# Patient Record
Sex: MALE | Race: BLACK OR AFRICAN AMERICAN | URBAN - METROPOLITAN AREA
[De-identification: names, ages, dates, MRNs, and addresses within clinical notes are randomized per-mention and may not be internally consistent; named-entity substitution may affect disease eponyms.]

---

## 2022-01-14 ENCOUNTER — WEB ENCOUNTER (OUTPATIENT)
Dept: URBAN - METROPOLITAN AREA CLINIC 40 | Facility: CLINIC | Age: 62
End: 2022-01-14

## 2022-01-19 ENCOUNTER — OFFICE VISIT (OUTPATIENT)
Dept: URBAN - METROPOLITAN AREA CLINIC 74 | Facility: CLINIC | Age: 62
End: 2022-01-19
Payer: COMMERCIAL

## 2022-01-19 ENCOUNTER — WEB ENCOUNTER (OUTPATIENT)
Dept: URBAN - METROPOLITAN AREA CLINIC 74 | Facility: CLINIC | Age: 62
End: 2022-01-19

## 2022-01-19 DIAGNOSIS — Z12.11 COLON CANCER SCREENING: ICD-10-CM

## 2022-01-19 PROCEDURE — 99202 OFFICE O/P NEW SF 15 MIN: CPT | Performed by: STUDENT IN AN ORGANIZED HEALTH CARE EDUCATION/TRAINING PROGRAM

## 2022-01-19 RX ORDER — LISINOPRIL 10 MG/1
1 TABLET TABLET ORAL ONCE A DAY
Status: ACTIVE | COMMUNITY

## 2022-01-19 RX ORDER — QUETIAPINE 100 MG/1
1 TABLET AT BEDTIME TABLET, FILM COATED ORAL ONCE A DAY
Status: ACTIVE | COMMUNITY

## 2022-01-19 RX ORDER — SODIUM PICOSULFATE, MAGNESIUM OXIDE, AND ANHYDROUS CITRIC ACID 10; 3.5; 12 MG/160ML; G/160ML; G/160ML
160 ML LIQUID ORAL
Qty: 320 MILLILITER | Refills: 0 | OUTPATIENT
Start: 2022-01-19 | End: 2022-01-20

## 2022-01-19 RX ORDER — AMLODIPINE BESYLATE 5 MG/1
1 TABLET TABLET ORAL ONCE A DAY
Status: ACTIVE | COMMUNITY

## 2022-01-19 RX ORDER — LITHIUM CARBONATE 150 MG/1
1 CAPSULE CAPSULE, GELATIN COATED ORAL TWICE A DAY
Status: ACTIVE | COMMUNITY

## 2022-01-19 RX ORDER — PANTOPRAZOLE SODIUM 40 MG/1
1 TABLET TABLET, DELAYED RELEASE ORAL ONCE A DAY
Status: ACTIVE | COMMUNITY

## 2022-01-19 NOTE — HPI-TODAY'S VISIT:
61-year-old male New to me Comes in to set up his colonoscopy No prior colonoscopy ever. Denies any GI specific symptoms.  No abdomen pain, nausea, vomiting, reflux, dysphagia, constipation, diarrhea or blood in stool. No family history of colon cancer. No anticoagulation.

## 2022-03-31 ENCOUNTER — OFFICE VISIT (OUTPATIENT)
Dept: URBAN - METROPOLITAN AREA SURGERY CENTER 30 | Facility: SURGERY CENTER | Age: 62
End: 2022-03-31

## 2022-03-31 ENCOUNTER — TELEPHONE ENCOUNTER (OUTPATIENT)
Dept: URBAN - METROPOLITAN AREA CLINIC 92 | Facility: CLINIC | Age: 62
End: 2022-03-31

## 2022-03-31 ENCOUNTER — OFFICE VISIT (OUTPATIENT)
Dept: URBAN - METROPOLITAN AREA SURGERY CENTER 30 | Facility: SURGERY CENTER | Age: 62
End: 2022-03-31
Payer: COMMERCIAL

## 2022-03-31 ENCOUNTER — CLAIMS CREATED FROM THE CLAIM WINDOW (OUTPATIENT)
Dept: URBAN - METROPOLITAN AREA CLINIC 4 | Facility: CLINIC | Age: 62
End: 2022-03-31
Payer: COMMERCIAL

## 2022-03-31 DIAGNOSIS — Z12.11 COLON CANCER SCREENING: ICD-10-CM

## 2022-03-31 DIAGNOSIS — D12.8 BENIGN NEOPLASM OF RECTUM: ICD-10-CM

## 2022-03-31 DIAGNOSIS — D12.4 BENIGN NEOPLASM OF DESCENDING COLON: ICD-10-CM

## 2022-03-31 DIAGNOSIS — D12.7 ADENOMA DETERMINED BY COLORECTAL BIOPSY: ICD-10-CM

## 2022-03-31 DIAGNOSIS — D12.2 BENIGN NEOPLASM OF ASCENDING COLON: ICD-10-CM

## 2022-03-31 DIAGNOSIS — D12.3 ADENOMA OF TRANSVERSE COLON: ICD-10-CM

## 2022-03-31 DIAGNOSIS — D12.5 BENIGN NEOPLASM OF SIGMOID COLON: ICD-10-CM

## 2022-03-31 DIAGNOSIS — D12.5 ADENOMA OF SIGMOID COLON: ICD-10-CM

## 2022-03-31 DIAGNOSIS — D12.3 BENIGN NEOPLASM OF TRANSVERSE COLON: ICD-10-CM

## 2022-03-31 DIAGNOSIS — D12.2 ADENOMA OF ASCENDING COLON: ICD-10-CM

## 2022-03-31 DIAGNOSIS — D12.4 ADENOMA OF DESCENDING COLON: ICD-10-CM

## 2022-03-31 PROCEDURE — G8907 PT DOC NO EVENTS ON DISCHARG: HCPCS | Performed by: STUDENT IN AN ORGANIZED HEALTH CARE EDUCATION/TRAINING PROGRAM

## 2022-03-31 PROCEDURE — 45385 COLONOSCOPY W/LESION REMOVAL: CPT | Performed by: STUDENT IN AN ORGANIZED HEALTH CARE EDUCATION/TRAINING PROGRAM

## 2022-03-31 PROCEDURE — 88305 TISSUE EXAM BY PATHOLOGIST: CPT | Performed by: PATHOLOGY

## 2022-03-31 RX ORDER — AMLODIPINE BESYLATE 5 MG/1
1 TABLET TABLET ORAL ONCE A DAY
Status: ACTIVE | COMMUNITY

## 2022-03-31 RX ORDER — LITHIUM CARBONATE 150 MG/1
1 CAPSULE CAPSULE, GELATIN COATED ORAL TWICE A DAY
Status: ACTIVE | COMMUNITY

## 2022-03-31 RX ORDER — LISINOPRIL 10 MG/1
1 TABLET TABLET ORAL ONCE A DAY
Status: ACTIVE | COMMUNITY

## 2022-03-31 RX ORDER — QUETIAPINE 100 MG/1
1 TABLET AT BEDTIME TABLET, FILM COATED ORAL ONCE A DAY
Status: ACTIVE | COMMUNITY

## 2022-03-31 RX ORDER — PANTOPRAZOLE SODIUM 40 MG/1
1 TABLET TABLET, DELAYED RELEASE ORAL ONCE A DAY
Status: ACTIVE | COMMUNITY

## 2022-04-01 ENCOUNTER — OUT OF OFFICE VISIT (OUTPATIENT)
Dept: URBAN - METROPOLITAN AREA MEDICAL CENTER 30 | Facility: MEDICAL CENTER | Age: 62
End: 2022-04-01
Payer: COMMERCIAL

## 2022-04-01 DIAGNOSIS — K91.840 BLEEDING AS COMPLICATION OF PANCREATIC-BILIARY SPHINCTEROTOMY: ICD-10-CM

## 2022-04-01 PROCEDURE — 99203 OFFICE O/P NEW LOW 30 MIN: CPT | Performed by: INTERNAL MEDICINE

## 2022-04-04 ENCOUNTER — ERX REFILL RESPONSE (OUTPATIENT)
Dept: URBAN - METROPOLITAN AREA CLINIC 74 | Facility: CLINIC | Age: 62
End: 2022-04-04

## 2022-04-04 RX ORDER — SODIUM PICOSULFATE, MAGNESIUM OXIDE, AND ANHYDROUS CITRIC ACID 10; 3.5; 12 MG/160ML; G/160ML; G/160ML
TAKE 160ML BY MOUTH TWICE DAILY FOR 1 DAY AS DIRECTED LIQUID ORAL
Qty: 320 MILLILITER | Refills: 0 | OUTPATIENT

## 2022-04-04 RX ORDER — SODIUM PICOSULFATE, MAGNESIUM OXIDE, AND ANHYDROUS CITRIC ACID 10; 3.5; 12 MG/160ML; G/160ML; G/160ML
TAKE 160ML BY MOUTH TWICE DAILY FOR 1 DAY AS DIRECTED LIQUID ORAL
Qty: 320 MILLILITER | Refills: 1 | OUTPATIENT

## 2022-04-25 ENCOUNTER — OFFICE VISIT (OUTPATIENT)
Dept: URBAN - METROPOLITAN AREA CLINIC 74 | Facility: CLINIC | Age: 62
End: 2022-04-25

## 2022-04-25 RX ORDER — PANTOPRAZOLE SODIUM 40 MG/1
1 TABLET TABLET, DELAYED RELEASE ORAL ONCE A DAY
Status: ACTIVE | COMMUNITY

## 2022-04-25 RX ORDER — LISINOPRIL 10 MG/1
1 TABLET TABLET ORAL ONCE A DAY
Status: ACTIVE | COMMUNITY

## 2022-04-25 RX ORDER — QUETIAPINE 100 MG/1
1 TABLET AT BEDTIME TABLET, FILM COATED ORAL ONCE A DAY
Status: ACTIVE | COMMUNITY

## 2022-04-25 RX ORDER — AMLODIPINE BESYLATE 5 MG/1
1 TABLET TABLET ORAL ONCE A DAY
Status: ACTIVE | COMMUNITY

## 2022-04-25 RX ORDER — LITHIUM CARBONATE 150 MG/1
1 CAPSULE CAPSULE, GELATIN COATED ORAL TWICE A DAY
Status: ACTIVE | COMMUNITY

## 2022-04-25 RX ORDER — SODIUM PICOSULFATE, MAGNESIUM OXIDE, AND ANHYDROUS CITRIC ACID 10; 3.5; 12 MG/160ML; G/160ML; G/160ML
TAKE 160ML BY MOUTH TWICE DAILY FOR 1 DAY AS DIRECTED LIQUID ORAL
Qty: 320 MILLILITER | Refills: 1 | Status: ACTIVE | COMMUNITY

## 2022-06-07 ENCOUNTER — WEB ENCOUNTER (OUTPATIENT)
Dept: URBAN - METROPOLITAN AREA CLINIC 74 | Facility: CLINIC | Age: 62
End: 2022-06-07

## 2022-06-07 ENCOUNTER — LAB OUTSIDE AN ENCOUNTER (OUTPATIENT)
Dept: URBAN - METROPOLITAN AREA CLINIC 74 | Facility: CLINIC | Age: 62
End: 2022-06-07

## 2022-06-07 ENCOUNTER — CLAIMS CREATED FROM THE CLAIM WINDOW (OUTPATIENT)
Dept: URBAN - METROPOLITAN AREA CLINIC 74 | Facility: CLINIC | Age: 62
End: 2022-06-07
Payer: COMMERCIAL

## 2022-06-07 VITALS
DIASTOLIC BLOOD PRESSURE: 90 MMHG | SYSTOLIC BLOOD PRESSURE: 132 MMHG | HEART RATE: 72 BPM | WEIGHT: 187.6 LBS | BODY MASS INDEX: 29.44 KG/M2 | TEMPERATURE: 96.8 F | OXYGEN SATURATION: 97 % | HEIGHT: 67 IN

## 2022-06-07 DIAGNOSIS — Z98.890 STATUS POST COLONOSCOPY WITH POLYPECTOMY: ICD-10-CM

## 2022-06-07 DIAGNOSIS — D12.6 TUBULAR ADENOMA OF COLON: ICD-10-CM

## 2022-06-07 DIAGNOSIS — R10.13 EPIGASTRIC ABDOMINAL PAIN: ICD-10-CM

## 2022-06-07 DIAGNOSIS — K21.9 GERD WITHOUT ESOPHAGITIS: ICD-10-CM

## 2022-06-07 DIAGNOSIS — Z12.11 COLON CANCER SCREENING: ICD-10-CM

## 2022-06-07 DIAGNOSIS — R14.0 BLOATING: ICD-10-CM

## 2022-06-07 PROCEDURE — 99214 OFFICE O/P EST MOD 30 MIN: CPT | Performed by: INTERNAL MEDICINE

## 2022-06-07 RX ORDER — LISINOPRIL 10 MG/1
1 TABLET TABLET ORAL ONCE A DAY
Status: DISCONTINUED | COMMUNITY

## 2022-06-07 RX ORDER — AMLODIPINE BESYLATE 5 MG/1
1 TABLET TABLET ORAL ONCE A DAY
Status: DISCONTINUED | COMMUNITY

## 2022-06-07 RX ORDER — PANTOPRAZOLE SODIUM 40 MG/1
1 TABLET TABLET, DELAYED RELEASE ORAL ONCE A DAY
Status: ACTIVE | COMMUNITY

## 2022-06-07 RX ORDER — QUETIAPINE 100 MG/1
1 TABLET AT BEDTIME TABLET, FILM COATED ORAL ONCE A DAY
Status: ACTIVE | COMMUNITY

## 2022-06-07 RX ORDER — SODIUM PICOSULFATE, MAGNESIUM OXIDE, AND ANHYDROUS CITRIC ACID 10; 3.5; 12 MG/160ML; G/160ML; G/160ML
TAKE 160ML BY MOUTH TWICE DAILY FOR 1 DAY AS DIRECTED LIQUID ORAL
Qty: 320 MILLILITER | Refills: 1 | Status: ON HOLD | COMMUNITY

## 2022-06-07 RX ORDER — LITHIUM CARBONATE 150 MG/1
1 CAPSULE CAPSULE, GELATIN COATED ORAL TWICE A DAY
Status: ACTIVE | COMMUNITY

## 2022-06-07 NOTE — HPI-TODAY'S VISIT:
61-year-old -American male here for follow-up after recent colonoscopy.  On his colonoscopy, he was noted to have about 18-20 tubular adenomas. He did have post polypectomy bleed that resolved on its own.  This was 2 months ago.   We will refer for genetic counseling, though he denies any family history of colon cancer.  Unfortunately he does not know much of his family history also.  Complains of feeling bloated.  Increased abdominal distention noted per wife.  CT angiogram did not show any significant ascites.  Has longstanding reflux.  No prior upper endoscopy.  Has chronic cough as well.  Former alcoholic. still smokes.

## 2022-06-09 ENCOUNTER — OFFICE VISIT (OUTPATIENT)
Dept: URBAN - METROPOLITAN AREA SURGERY CENTER 30 | Facility: SURGERY CENTER | Age: 62
End: 2022-06-09
Payer: COMMERCIAL

## 2022-06-09 ENCOUNTER — TELEPHONE ENCOUNTER (OUTPATIENT)
Dept: URBAN - METROPOLITAN AREA CLINIC 92 | Facility: CLINIC | Age: 62
End: 2022-06-09

## 2022-06-09 ENCOUNTER — CLAIMS CREATED FROM THE CLAIM WINDOW (OUTPATIENT)
Dept: URBAN - METROPOLITAN AREA CLINIC 4 | Facility: CLINIC | Age: 62
End: 2022-06-09
Payer: COMMERCIAL

## 2022-06-09 DIAGNOSIS — K29.80 ACUTE DUODENITIS: ICD-10-CM

## 2022-06-09 DIAGNOSIS — K31.89 OTHER DISEASES OF STOMACH AND DUODENUM: ICD-10-CM

## 2022-06-09 DIAGNOSIS — K31.89 ACQUIRED DEFORMITY OF DUODENUM: ICD-10-CM

## 2022-06-09 DIAGNOSIS — K25.9 ANTRAL ULCER: ICD-10-CM

## 2022-06-09 PROBLEM — 444898006: Status: ACTIVE | Noted: 2022-06-07

## 2022-06-09 PROBLEM — 116289008: Status: ACTIVE | Noted: 2022-06-07

## 2022-06-09 PROCEDURE — 88305 TISSUE EXAM BY PATHOLOGIST: CPT | Performed by: PATHOLOGY

## 2022-06-09 PROCEDURE — 88312 SPECIAL STAINS GROUP 1: CPT | Performed by: PATHOLOGY

## 2022-06-09 PROCEDURE — 43239 EGD BIOPSY SINGLE/MULTIPLE: CPT | Performed by: INTERNAL MEDICINE

## 2022-06-09 PROCEDURE — G8907 PT DOC NO EVENTS ON DISCHARG: HCPCS | Performed by: INTERNAL MEDICINE

## 2022-06-09 RX ORDER — PANTOPRAZOLE SODIUM 40 MG/1
1 TABLET TABLET, DELAYED RELEASE ORAL ONCE A DAY
Status: ACTIVE | COMMUNITY

## 2022-06-09 RX ORDER — SODIUM PICOSULFATE, MAGNESIUM OXIDE, AND ANHYDROUS CITRIC ACID 10; 3.5; 12 MG/160ML; G/160ML; G/160ML
TAKE 160ML BY MOUTH TWICE DAILY FOR 1 DAY AS DIRECTED LIQUID ORAL
Qty: 320 MILLILITER | Refills: 1 | Status: ON HOLD | COMMUNITY

## 2022-06-09 RX ORDER — LITHIUM CARBONATE 150 MG/1
1 CAPSULE CAPSULE, GELATIN COATED ORAL TWICE A DAY
Status: ACTIVE | COMMUNITY

## 2022-06-09 RX ORDER — QUETIAPINE 100 MG/1
1 TABLET AT BEDTIME TABLET, FILM COATED ORAL ONCE A DAY
Status: ACTIVE | COMMUNITY

## 2022-06-09 RX ORDER — PANTOPRAZOLE SODIUM 40 MG/1
1 TABLET TABLET, DELAYED RELEASE ORAL ONCE A DAY
Qty: 30 TABLET | Refills: 3

## 2022-07-25 ENCOUNTER — OFFICE VISIT (OUTPATIENT)
Dept: URBAN - METROPOLITAN AREA CLINIC 74 | Facility: CLINIC | Age: 62
End: 2022-07-25

## 2022-07-28 ENCOUNTER — OFFICE VISIT (OUTPATIENT)
Dept: URBAN - METROPOLITAN AREA CLINIC 74 | Facility: CLINIC | Age: 62
End: 2022-07-28
Payer: COMMERCIAL

## 2022-07-28 ENCOUNTER — TELEPHONE ENCOUNTER (OUTPATIENT)
Dept: URBAN - METROPOLITAN AREA CLINIC 74 | Facility: CLINIC | Age: 62
End: 2022-07-28

## 2022-07-28 VITALS
BODY MASS INDEX: 29.13 KG/M2 | WEIGHT: 185.6 LBS | HEIGHT: 67 IN | SYSTOLIC BLOOD PRESSURE: 134 MMHG | DIASTOLIC BLOOD PRESSURE: 82 MMHG | TEMPERATURE: 98.1 F | OXYGEN SATURATION: 96 % | HEART RATE: 83 BPM

## 2022-07-28 DIAGNOSIS — Z86.010 PERSONAL HISTORY OF COLONIC POLYPS: ICD-10-CM

## 2022-07-28 DIAGNOSIS — K44.9 HIATAL HERNIA: ICD-10-CM

## 2022-07-28 DIAGNOSIS — K25.7 CHRONIC GASTRIC ULCER WITHOUT HEMORRHAGE AND WITHOUT PERFORATION: ICD-10-CM

## 2022-07-28 DIAGNOSIS — K29.80 DUODENITIS: ICD-10-CM

## 2022-07-28 DIAGNOSIS — K29.40 ATROPHIC GASTRITIS WITHOUT HEMORRHAGE: ICD-10-CM

## 2022-07-28 PROBLEM — 72007001: Status: ACTIVE | Noted: 2022-07-26

## 2022-07-28 PROBLEM — 84089009: Status: ACTIVE | Noted: 2022-07-28

## 2022-07-28 PROBLEM — 84568007: Status: ACTIVE | Noted: 2022-07-26

## 2022-07-28 PROCEDURE — 99213 OFFICE O/P EST LOW 20 MIN: CPT | Performed by: PHYSICIAN ASSISTANT

## 2022-07-28 RX ORDER — LITHIUM CARBONATE 150 MG/1
1 CAPSULE CAPSULE, GELATIN COATED ORAL TWICE A DAY
COMMUNITY

## 2022-07-28 RX ORDER — SODIUM PICOSULFATE, MAGNESIUM OXIDE, AND ANHYDROUS CITRIC ACID 10; 3.5; 12 MG/160ML; G/160ML; G/160ML
TAKE 160ML BY MOUTH TWICE DAILY FOR 1 DAY AS DIRECTED LIQUID ORAL
Qty: 320 MILLILITER | Refills: 1 | Status: DISCONTINUED | COMMUNITY

## 2022-07-28 RX ORDER — PANTOPRAZOLE SODIUM 40 MG/1
1 TABLET TABLET, DELAYED RELEASE ORAL ONCE A DAY
Qty: 30 TABLET | Refills: 3 | COMMUNITY

## 2022-07-28 RX ORDER — QUETIAPINE 100 MG/1
1 TABLET AT BEDTIME TABLET, FILM COATED ORAL ONCE A DAY
COMMUNITY

## 2022-07-28 NOTE — HPI-TODAY'S VISIT:
The patient is 61 year-old male known to Dr. Clay returning to our clinic today to discuss his EGD result. He is currently on Pantoprazole 40 mg once daily with good result. The patient states that he has not heard from anybody in regard to his biopsy since his procedure. He has  his wife on speaker phone to make sure that we also discuss his procedure results with her. Office visit was completed with his wife on speaker phone. They wanted me to get his lab results from his PCP to review the results. Procedures including EGD and Colonoscopy were reviewed in great deatils with both of them. Reviewed labs and imaging from [facility] on 04/02/2022.   --  The patient denies dyspepsia, dysphagia, odynophagia, hemoptysis, hematemesis, nausea, vomiting, regurgitation, melena, constipation, diarrhea, abdominal pain, hematochezia, fever, chills, chest pain, SOB, or any other GI complaints today. --  The patient denies ETOH and Illicit drug use. He smoke Tobacco daily. -- The patient is up to date with Flu, Pneumonia, Shingles, and COVID vaccine 3/3.  Procedures: -- EGD with biopsy on 06/09/2022 by dr. Clay noted 3 cm hiatal hernia, chronic gastritis, non-bleeding gastric ulcer with no stigma of bleeing. Chronic duodenitis. 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 No H. pylori organisms. No intestinal metaplasia.   -- Colonoscopy with polypectomy on 03/31/2022 by Dr. Clay noted multiple (15 polyps measuring 4-12 mm polys in the sigmoid colon, in the descending colon, at the hepatic flexure, and in the ascending colon. Removed with a hot snare. Resected and retrieved. One 15 mm poly at the heaptic fleaxure, removed with a hot snare. Resected and retrieved. One 14 mm polyp at the recto-sigmoid colon, removed with a hot snare. Resected and retrieved. Biopsy with with tubular adenoma colon polyps. Repeat colonoscopy in one year.

## 2023-01-31 ENCOUNTER — TELEPHONE ENCOUNTER (OUTPATIENT)
Dept: URBAN - METROPOLITAN AREA CLINIC 74 | Facility: CLINIC | Age: 63
End: 2023-01-31

## 2023-02-25 ENCOUNTER — WEB ENCOUNTER (OUTPATIENT)
Dept: URBAN - METROPOLITAN AREA CLINIC 74 | Facility: CLINIC | Age: 63
End: 2023-02-25

## 2023-02-28 ENCOUNTER — OFFICE VISIT (OUTPATIENT)
Dept: URBAN - METROPOLITAN AREA CLINIC 74 | Facility: CLINIC | Age: 63
End: 2023-02-28
Payer: COMMERCIAL

## 2023-02-28 VITALS
TEMPERATURE: 97.7 F | BODY MASS INDEX: 30.1 KG/M2 | HEART RATE: 81 BPM | HEIGHT: 67 IN | SYSTOLIC BLOOD PRESSURE: 148 MMHG | DIASTOLIC BLOOD PRESSURE: 100 MMHG | WEIGHT: 191.8 LBS

## 2023-02-28 DIAGNOSIS — Z87.11 HISTORY OF GASTRIC ULCER: ICD-10-CM

## 2023-02-28 DIAGNOSIS — Z86.010 PERSONAL HISTORY OF COLONIC POLYPS: ICD-10-CM

## 2023-02-28 DIAGNOSIS — K29.30 CHRONIC SUPERFICIAL GASTRITIS WITHOUT BLEEDING: ICD-10-CM

## 2023-02-28 DIAGNOSIS — K25.7 CHRONIC GASTRIC ULCER WITHOUT HEMORRHAGE AND WITHOUT PERFORATION: ICD-10-CM

## 2023-02-28 PROBLEM — 196735001: Status: ACTIVE | Noted: 2023-01-31

## 2023-02-28 PROBLEM — 428283002: Status: ACTIVE | Noted: 2022-07-26

## 2023-02-28 PROBLEM — 76796008: Status: ACTIVE | Noted: 2022-07-26

## 2023-02-28 PROCEDURE — 99214 OFFICE O/P EST MOD 30 MIN: CPT | Performed by: PHYSICIAN ASSISTANT

## 2023-02-28 RX ORDER — LITHIUM CARBONATE 300 MG/1
1 CAPSULE CAPSULE, GELATIN COATED ORAL TWICE A DAY
Status: ACTIVE | COMMUNITY

## 2023-02-28 RX ORDER — PANTOPRAZOLE SODIUM 40 MG/1
1 TABLET TABLET, DELAYED RELEASE ORAL ONCE A DAY
Qty: 30 TABLET | Refills: 3 | Status: ACTIVE | COMMUNITY

## 2023-02-28 RX ORDER — QUETIAPINE 300 MG/1
1 TABLET AT BEDTIME TABLET, FILM COATED ORAL ONCE A DAY
Status: ACTIVE | COMMUNITY

## 2023-02-28 RX ORDER — SUCRALFATE 1 G/1
1 TABLET ON AN EMPTY STOMACH TABLET ORAL TWICE A DAY
Qty: 60 TABLET | Refills: 0 | OUTPATIENT
Start: 2023-02-28 | End: 2023-03-30

## 2023-02-28 RX ORDER — LISINOPRIL 20 MG/1
1 TABLET TABLET ORAL ONCE A DAY
Status: ACTIVE | COMMUNITY

## 2023-02-28 RX ORDER — POLYETHYLENE GLYCOL 3350 17 G/17G
AS DIRECTED POWDER, FOR SOLUTION ORAL
Qty: 238 G | Refills: 0 | OUTPATIENT
Start: 2023-02-28 | End: 2023-03-01

## 2023-02-28 RX ORDER — PANTOPRAZOLE SODIUM 40 MG/1
1 TABLET TABLET, DELAYED RELEASE ORAL ONCE A DAY
Qty: 30 TABLET | Refills: 5

## 2023-02-28 NOTE — HPI-TODAY'S VISIT:
The patient is 61 year-old male known to Dr. Clay returning to our clinic today to schedule his colonoscopy. He did have postpolypectomy bleeding after his colonoscopy in 03/2022 and he was admitted to  for 24 hours observation. Last year, Dr. Clay arecommend Colonoscopy in one year and referral to genetic Counsellor for assessment of polyposis syndrome. The patient did not have genetic conunselling. Today, he is complaining of stomach discomfort eventhough he is taking Pantoprazole 40 mg daily. He has pain at epigastric region with burning. No changes in his bowel habits. No overt GI blood loss. No other GI issues. His PCP scheduled him for Liver US. He will bring us a copy of his US result next visit.   --  The patient denies dyspepsia, dysphagia, odynophagia, hemoptysis, hematemesis, nausea, vomiting, regurgitation, melena, constipation, diarrhea, abdominal pain, hematochezia, fever, chills, chest pain, SOB, or any other GI complaints today. --  The patient denies ETOH and Illicit drug use. He smoke Tobacco daily. -- The patient is up to date with Flu, Pneumonia, Shingles, and COVID vaccine 3/3.  Procedures: -- EGD with biopsy on 06/09/2022 by Dr. Clay noted 3 cm hiatal hernia, chronic gastritis, non-bleeding gastric ulcer with no stigma of bleeing. Chronic duodenitis. 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 No H. pylori organisms. No intestinal metaplasia.   -- Colonoscopy with polypectomy on 03/31/2022 by Dr. Clay noted multiple (15 polyps measuring 4-12 mm polys in the sigmoid colon, in the descending colon, at the hepatic flexure, and in the ascending colon. Removed with a hot snare. Resected and retrieved. One 15 mm poly at the heaptic fleaxure, removed with a hot snare. Resected and retrieved. One 14 mm polyp at the recto-sigmoid colon, removed with a hot snare. Resected and retrieved. Biopsy with with tubular adenoma colon polyps. Repeat colonoscopy in one year. Recommend Colonoscopy in one year and referral to genetic Counsellor for assessment of polyposis syndrome.

## 2023-03-28 ENCOUNTER — ERX REFILL RESPONSE (OUTPATIENT)
Dept: URBAN - METROPOLITAN AREA CLINIC 74 | Facility: CLINIC | Age: 63
End: 2023-03-28

## 2023-03-28 RX ORDER — SUCRALFATE 1 G/1
1 TABLET ON AN EMPTY STOMACH TABLET ORAL TWICE A DAY
Qty: 60 TABLET | Refills: 0 | OUTPATIENT

## 2023-03-28 RX ORDER — SUCRALFATE 1 G/1
TAKE 1 TABLET BY MOUTH TWICE DAILY ON AN EMPTY STOMACH TABLET ORAL
Qty: 60 TABLET | Refills: 0 | OUTPATIENT

## 2023-04-13 ENCOUNTER — OFFICE VISIT (OUTPATIENT)
Dept: URBAN - METROPOLITAN AREA SURGERY CENTER 30 | Facility: SURGERY CENTER | Age: 63
End: 2023-04-13

## 2023-05-11 ENCOUNTER — OFFICE VISIT (OUTPATIENT)
Dept: URBAN - METROPOLITAN AREA CLINIC 74 | Facility: CLINIC | Age: 63
End: 2023-05-11

## 2023-05-11 NOTE — HPI-TODAY'S VISIT:
The patient is 61 year-old male known to Dr. Clay returning to our clinic today to discuss his recent EGD and Colonoscopy results.  --  The patient denies dyspepsia, dysphagia, odynophagia, hemoptysis, hematemesis, nausea, vomiting, regurgitation, melena, constipation, diarrhea, abdominal pain, hematochezia, fever, chills, chest pain, SOB, or any other GI complaints today. --  The patient denies ETOH and Illicit drug use. He smoke Tobacco daily. -- The patient is up to date with Flu, Pneumonia, Shingles, and COVID vaccine 3/3.  Procedures: -- EGD with biopsy on 06/09/2022 by Dr. Clay noted 3 cm hiatal hernia, chronic gastritis, non-bleeding gastric ulcer with no stigma of bleeing. Chronic duodenitis. 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 No H. pylori organisms. No intestinal metaplasia.   -- Colonoscopy with polypectomy on 03/31/2022 by Dr. Clay noted multiple (15 polyps measuring 4-12 mm polys in the sigmoid colon, in the descending colon, at the hepatic flexure, and in the ascending colon. Removed with a hot snare. Resected and retrieved. One 15 mm poly at the heaptic fleaxure, removed with a hot snare. Resected and retrieved. One 14 mm polyp at the recto-sigmoid colon, removed with a hot snare. Resected and retrieved. Biopsy with with tubular adenoma colon polyps. Repeat colonoscopy in one year. Recommend Colonoscopy in one year and referral to genetic Counsellor for assessment of polyposis syndrome.

## 2023-05-16 ENCOUNTER — ERX REFILL RESPONSE (OUTPATIENT)
Dept: URBAN - METROPOLITAN AREA CLINIC 74 | Facility: CLINIC | Age: 63
End: 2023-05-16

## 2023-05-16 RX ORDER — SUCRALFATE 1 G/1
TAKE 1 TABLET BY MOUTH TWICE DAILY ON AN EMPTY STOMACH TABLET ORAL
Qty: 60 TABLET | Refills: 0 | OUTPATIENT

## 2023-06-12 ENCOUNTER — ERX REFILL RESPONSE (OUTPATIENT)
Dept: URBAN - METROPOLITAN AREA CLINIC 40 | Facility: CLINIC | Age: 63
End: 2023-06-12

## 2023-06-12 RX ORDER — SUCRALFATE 1 G/1
TAKE 1 TABLET BY MOUTH TWICE DAILY ON AN EMPTY STOMACH TABLET ORAL
Qty: 60 TABLET | Refills: 0 | OUTPATIENT

## 2023-06-13 ENCOUNTER — ERX REFILL RESPONSE (OUTPATIENT)
Dept: URBAN - METROPOLITAN AREA CLINIC 40 | Facility: CLINIC | Age: 63
End: 2023-06-13

## 2023-06-13 RX ORDER — SUCRALFATE 1 G/1
TAKE 1 TABLET BY MOUTH TWICE DAILY ON AN EMPTY STOMACH TABLET ORAL
Qty: 60 TABLET | Refills: 3 | OUTPATIENT

## 2023-06-13 RX ORDER — SUCRALFATE 1 G/1
TAKE 1 TABLET BY MOUTH TWICE DAILY ON AN EMPTY STOMACH TABLET ORAL
Qty: 60 TABLET | Refills: 0 | OUTPATIENT

## 2023-10-15 ENCOUNTER — ERX REFILL RESPONSE (OUTPATIENT)
Dept: URBAN - METROPOLITAN AREA CLINIC 40 | Facility: CLINIC | Age: 63
End: 2023-10-15

## 2023-10-15 RX ORDER — SUCRALFATE 1 G/1
TAKE 1 TABLET BY MOUTH TWICE DAILY ON AN EMPTY STOMACH TABLET ORAL
Qty: 60 TABLET | Refills: 3 | OUTPATIENT

## 2024-01-16 ENCOUNTER — OFFICE VISIT (OUTPATIENT)
Dept: URBAN - METROPOLITAN AREA CLINIC 40 | Facility: CLINIC | Age: 64
End: 2024-01-16
Payer: COMMERCIAL

## 2024-01-16 ENCOUNTER — LAB OUTSIDE AN ENCOUNTER (OUTPATIENT)
Dept: URBAN - METROPOLITAN AREA CLINIC 40 | Facility: CLINIC | Age: 64
End: 2024-01-16

## 2024-01-16 VITALS
HEART RATE: 72 BPM | BODY MASS INDEX: 32.55 KG/M2 | SYSTOLIC BLOOD PRESSURE: 134 MMHG | HEIGHT: 67 IN | DIASTOLIC BLOOD PRESSURE: 86 MMHG | WEIGHT: 207.4 LBS | TEMPERATURE: 99.3 F

## 2024-01-16 DIAGNOSIS — K44.9 ESOPHAGEAL HIATUS HERNIA: ICD-10-CM

## 2024-01-16 DIAGNOSIS — K25.7 CHRONIC GASTRIC ULCER WITHOUT HEMORRHAGE AND WITHOUT PERFORATION: ICD-10-CM

## 2024-01-16 DIAGNOSIS — Z86.010 PERSONAL HISTORY OF COLONIC POLYPS: ICD-10-CM

## 2024-01-16 DIAGNOSIS — K29.30 CHRONIC SUPERFICIAL GASTRITIS WITHOUT BLEEDING: ICD-10-CM

## 2024-01-16 DIAGNOSIS — K21.9 CHRONIC GERD: ICD-10-CM

## 2024-01-16 DIAGNOSIS — Z87.11 HISTORY OF GASTRIC ULCER: ICD-10-CM

## 2024-01-16 PROCEDURE — 99214 OFFICE O/P EST MOD 30 MIN: CPT | Performed by: INTERNAL MEDICINE

## 2024-01-16 RX ORDER — QUETIAPINE 300 MG/1
1 TABLET AT BEDTIME TABLET, FILM COATED ORAL ONCE A DAY
Status: ACTIVE | COMMUNITY

## 2024-01-16 RX ORDER — PANTOPRAZOLE SODIUM 40 MG/1
1 TABLET TABLET, DELAYED RELEASE ORAL ONCE A DAY
Qty: 30 TABLET | Refills: 5 | Status: ACTIVE | COMMUNITY

## 2024-01-16 RX ORDER — LISINOPRIL 20 MG/1
1 TABLET TABLET ORAL ONCE A DAY
Status: ACTIVE | COMMUNITY

## 2024-01-16 RX ORDER — SUCRALFATE 1 G/1
TAKE 1 TABLET BY MOUTH TWICE DAILY ON AN EMPTY STOMACH TABLET ORAL
Qty: 60 TABLET | Refills: 3 | Status: ACTIVE | COMMUNITY

## 2024-01-16 RX ORDER — LITHIUM CARBONATE 300 MG/1
1 CAPSULE CAPSULE, GELATIN COATED ORAL TWICE A DAY
Status: ACTIVE | COMMUNITY

## 2024-01-16 NOTE — HPI-TODAY'S VISIT:
The patient is 63 year-old male here for surveillance colonoscopy  --  The patient denies dyspepsia, dysphagia, odynophagia, hemoptysis, hematemesis, nausea, vomiting, regurgitation, melena, constipation, diarrhea, abdominal pain, hematochezia, fever, chills, chest pain, SOB, or any other GI complaints today. --  The patient denies ETOH and Illicit drug use. He smoke Tobacco daily. -- The patient is up to date with Flu, Pneumonia, Shingles, and COVID vaccine 3/3.  Procedures: -- EGD with biopsy on 06/09/2022 by Dr. Clay noted 3 cm hiatal hernia, chronic gastritis, non-bleeding gastric ulcer with no stigma of bleeing. Chronic duodenitis. 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 No H. pylori organisms. No intestinal metaplasia.   -- Colonoscopy with polypectomy on 03/31/2022 by Dr. Clay noted multiple (15 polyps measuring 4-12 mm polys in the sigmoid colon, in the descending colon, at the hepatic flexure, and in the ascending colon. Removed with a hot snare. Resected and retrieved. One 15 mm poly at the heaptic fleaxure, removed with a hot snare. Resected and retrieved. One 14 mm polyp at the recto-sigmoid colon, removed with a hot snare. Resected and retrieved. Biopsy with with tubular adenoma colon polyps. Repeat colonoscopy in one year. has not see genetic counselor. no known family member with CRC

## 2024-01-18 PROBLEM — 235595009: Status: ACTIVE | Noted: 2024-01-16

## 2024-02-15 ENCOUNTER — COL/EGD (OUTPATIENT)
Dept: URBAN - METROPOLITAN AREA SURGERY CENTER 30 | Facility: SURGERY CENTER | Age: 64
End: 2024-02-15
Payer: COMMERCIAL

## 2024-02-15 ENCOUNTER — LAB (OUTPATIENT)
Dept: URBAN - METROPOLITAN AREA CLINIC 4 | Facility: CLINIC | Age: 64
End: 2024-02-15
Payer: COMMERCIAL

## 2024-02-15 DIAGNOSIS — K31.A15 GASTRIC INTESTINAL METAPLASIA WITHOUT DYSPLASIA, INVOLVING MULTIPLE SITES: ICD-10-CM

## 2024-02-15 DIAGNOSIS — D12.3 ADENOMA OF TRANSVERSE COLON: ICD-10-CM

## 2024-02-15 DIAGNOSIS — K63.5 BENIGN COLON POLYP: ICD-10-CM

## 2024-02-15 DIAGNOSIS — K31.89 OTHER DISEASES OF STOMACH AND DUODENUM: ICD-10-CM

## 2024-02-15 DIAGNOSIS — Z86.010 ADENOMAS PERSONAL HISTORY OF COLONIC POLYPS: ICD-10-CM

## 2024-02-15 DIAGNOSIS — R10.13 ABDOMINAL DISCOMFORT, EPIGASTRIC: ICD-10-CM

## 2024-02-15 PROCEDURE — 88342 IMHCHEM/IMCYTCHM 1ST ANTB: CPT | Performed by: PATHOLOGY

## 2024-02-15 PROCEDURE — 88341 IMHCHEM/IMCYTCHM EA ADD ANTB: CPT | Performed by: PATHOLOGY

## 2024-02-15 PROCEDURE — 45380 COLONOSCOPY AND BIOPSY: CPT | Performed by: INTERNAL MEDICINE

## 2024-02-15 PROCEDURE — 88305 TISSUE EXAM BY PATHOLOGIST: CPT | Performed by: PATHOLOGY

## 2024-02-15 PROCEDURE — 43239 EGD BIOPSY SINGLE/MULTIPLE: CPT | Performed by: INTERNAL MEDICINE

## 2024-03-11 ENCOUNTER — OV EP (OUTPATIENT)
Dept: URBAN - METROPOLITAN AREA CLINIC 40 | Facility: CLINIC | Age: 64
End: 2024-03-11

## 2024-03-11 RX ORDER — PANTOPRAZOLE SODIUM 40 MG/1
1 TABLET TABLET, DELAYED RELEASE ORAL ONCE A DAY
Qty: 30 TABLET | Refills: 5 | Status: ACTIVE | COMMUNITY

## 2024-03-11 RX ORDER — LISINOPRIL 20 MG/1
1 TABLET TABLET ORAL ONCE A DAY
Status: ACTIVE | COMMUNITY

## 2024-03-11 RX ORDER — SUCRALFATE 1 G/1
TAKE 1 TABLET BY MOUTH TWICE DAILY ON AN EMPTY STOMACH TABLET ORAL
Qty: 60 TABLET | Refills: 3 | Status: ACTIVE | COMMUNITY

## 2024-03-11 RX ORDER — LITHIUM CARBONATE 300 MG/1
1 CAPSULE CAPSULE, GELATIN COATED ORAL TWICE A DAY
Status: ACTIVE | COMMUNITY

## 2024-03-11 RX ORDER — QUETIAPINE 300 MG/1
1 TABLET AT BEDTIME TABLET, FILM COATED ORAL ONCE A DAY
Status: ACTIVE | COMMUNITY

## 2024-03-11 NOTE — HPI-TODAY'S VISIT:
Mr. De León is a 63 year-old Black male who presents to office after recent surveillance colonoscopy 2/15/24, last seen in office 1/16/24.  Procedures: -- EGD with biopsy on 06/09/2022 by Dr. Clay noted 3 cm hiatal hernia, chronic gastritis, non-bleeding gastric ulcer with no stigma of bleeing. Chronic duodenitis. 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 No H. pylori organisms. No intestinal metaplasia.   -- Colonoscopy with polypectomy on 03/31/2022 by Dr. Clay noted multiple (15 polyps measuring 4-12 mm polys in the sigmoid colon, in the descending colon, at the hepatic flexure, and in the ascending colon. Removed with a hot snare. Resected and retrieved. One 15 mm poly at the heaptic fleaxure, removed with a hot snare. Resected and retrieved. One 14 mm polyp at the recto-sigmoid colon, removed with a hot snare. Resected and retrieved. Biopsy with with tubular adenoma colon polyps. Repeat colonoscopy in one year. has not see genetic counselor. no known family member with CRC With EGD, findings small hiatal hernia and diffuse mild gastritis with normal duodenum.  Per pathology, gastric biopsies with focal intestinal metaplasia, no H. pylori or malignancy.  He also completed a colonoscopy which was notable for perianal skin tags on exam and also nonbleeding internal hemorrhoids, grade 1.  Scattered diverticula noted in the left colon.  A total of 2 polyps ranging from 4 to 5 mm in size removed from the transverse colon and sigmoid colon respectively.  No complications following procedure.  These polyps were consistent with tubular adenoma in the transverse colon and hyperplastic colon polyp of the sigmoid colon.  Thus, a 5-year surveillance recommended given patient history.

## 2025-08-06 NOTE — PHYSICAL EXAM CHEST:
Pt in Urology clinic today for f/u visit.  [facility] urine obtained for dipstick UA.  Bladder scan done with 0 mls seen.  Evaluated per RINA Hopkins NP.  Will RTC in 6 months and return for bladder instillation 8/8/2025.     chest wall non-tender, breathing is unlabored without accessory muscle use, normal breath sounds